# Patient Record
Sex: FEMALE | Race: WHITE
[De-identification: names, ages, dates, MRNs, and addresses within clinical notes are randomized per-mention and may not be internally consistent; named-entity substitution may affect disease eponyms.]

---

## 2021-03-12 ENCOUNTER — HOSPITAL ENCOUNTER (OUTPATIENT)
Dept: HOSPITAL 95 - MOI US | Age: 71
Discharge: HOME | End: 2021-03-12
Attending: FAMILY MEDICINE
Payer: MEDICARE

## 2021-03-12 DIAGNOSIS — C50.412: Primary | ICD-10-CM

## 2021-03-12 DIAGNOSIS — Z17.0: ICD-10-CM

## 2021-03-12 PROCEDURE — A4648 IMPLANTABLE TISSUE MARKER: HCPCS

## 2021-03-26 ENCOUNTER — HOSPITAL ENCOUNTER (OUTPATIENT)
Dept: HOSPITAL 95 - MOI MAM | Age: 71
Discharge: HOME | End: 2021-03-26
Attending: STUDENT IN AN ORGANIZED HEALTH CARE EDUCATION/TRAINING PROGRAM
Payer: MEDICARE

## 2021-03-26 DIAGNOSIS — C50.412: Primary | ICD-10-CM

## 2021-03-26 DIAGNOSIS — Z17.0: ICD-10-CM

## 2021-03-26 PROCEDURE — A4648 IMPLANTABLE TISSUE MARKER: HCPCS

## 2021-03-30 ENCOUNTER — HOSPITAL ENCOUNTER (OUTPATIENT)
Dept: HOSPITAL 95 - NM | Age: 71
Discharge: HOME | End: 2021-03-30
Attending: STUDENT IN AN ORGANIZED HEALTH CARE EDUCATION/TRAINING PROGRAM
Payer: MEDICARE

## 2021-03-30 VITALS — HEIGHT: 65.98 IN | BODY MASS INDEX: 44.15 KG/M2 | WEIGHT: 274.7 LBS

## 2021-03-30 DIAGNOSIS — E66.01: ICD-10-CM

## 2021-03-30 DIAGNOSIS — I10: ICD-10-CM

## 2021-03-30 DIAGNOSIS — Z79.899: ICD-10-CM

## 2021-03-30 DIAGNOSIS — D36.0: ICD-10-CM

## 2021-03-30 DIAGNOSIS — J44.9: ICD-10-CM

## 2021-03-30 DIAGNOSIS — Z17.0: ICD-10-CM

## 2021-03-30 DIAGNOSIS — C50.412: Primary | ICD-10-CM

## 2021-03-30 PROCEDURE — A9520 TC99 TILMANOCEPT DIAG 0.5MCI: HCPCS

## 2021-03-30 PROCEDURE — 0HBU0ZZ EXCISION OF LEFT BREAST, OPEN APPROACH: ICD-10-PCS | Performed by: STUDENT IN AN ORGANIZED HEALTH CARE EDUCATION/TRAINING PROGRAM

## 2021-03-30 PROCEDURE — 07B60ZX EXCISION OF LEFT AXILLARY LYMPHATIC, OPEN APPROACH, DIAGNOSTIC: ICD-10-PCS | Performed by: STUDENT IN AN ORGANIZED HEALTH CARE EDUCATION/TRAINING PROGRAM

## 2021-03-30 PROCEDURE — A9270 NON-COVERED ITEM OR SERVICE: HCPCS

## 2021-03-30 NOTE — NUR
REVIEWED DC INSTRUCTIONS W/ PT SON NICK AND WITH PT. PT STATES LEG PAIN MIGHT
BE COMING FROM HER BACK, STATES OVERALL PAIN BETTER THAN WHEN SHE WOKE UP IN
PACU. GAVE NORCO PER ORDER, PT HAD PUDDING AND WATER, DISCUSSED PAIN
MEDICATION WITH PT AS PT DOES NOT HAVE TRUE ALLERGY TO OXYCODONE AND CODEINE,
PT AGREEABLE TO TRY HYDROCODONE AS ORDERED. BREAST BINDER ON,C/D/I. REPORT
GIVEN TO AGUSTIN.

## 2021-03-30 NOTE — NUR
PT CONTINUES TO DENY ABD PAIN OR NAUSEA p MEDS.
PT SITS AT SIDE OF BED c MINIMAL ASSISTANCE.
IV DC'D, CATH INTACT AND PRESSURE DRESSING APPLIED.
PT CONTINUES TO VERBALIZE AN UNDERSTANDING OF INSTRUCTIONS.  NO QUESTIONS.
ASSISTED c DRESSING, DRESSING CLEAN AND INTACT.
TAKEN OTD IN NAD VIA WC.
SON CONTINUES TO VERBALIZE AN UNDERSTANDING OF INSTRUCTIONS.
TRANSFERS TO CAR s DIFFICULTY.

## 2021-03-30 NOTE — NUR
Ambulatory in Day Surgery
History, Chart, Medications and Allergies reviewed before start of
procedure.Patient confirms NPO status and agrees with scheduled surgery.
Patient reports completing Chlorhexadine shower X2 prior to admission to
hospital.

## 2021-06-07 ENCOUNTER — HOSPITAL ENCOUNTER (OUTPATIENT)
Dept: HOSPITAL 95 - LAB | Age: 71
Discharge: HOME | End: 2021-06-07
Attending: OBSTETRICS & GYNECOLOGY
Payer: MEDICARE

## 2021-06-07 DIAGNOSIS — N76.0: Primary | ICD-10-CM

## 2021-06-30 ENCOUNTER — HOSPITAL ENCOUNTER (OUTPATIENT)
Dept: HOSPITAL 95 - LAB SHORT | Age: 71
End: 2021-06-30
Attending: OBSTETRICS & GYNECOLOGY
Payer: MEDICARE

## 2021-06-30 DIAGNOSIS — Z88.5: ICD-10-CM

## 2021-06-30 DIAGNOSIS — N89.8: Primary | ICD-10-CM

## 2021-06-30 DIAGNOSIS — Z88.6: ICD-10-CM

## 2021-06-30 DIAGNOSIS — Z91.048: ICD-10-CM

## 2021-06-30 DIAGNOSIS — Z88.8: ICD-10-CM

## 2021-10-29 ENCOUNTER — HOSPITAL ENCOUNTER (EMERGENCY)
Dept: HOSPITAL 95 - ER | Age: 71
Discharge: HOME | End: 2021-10-29
Payer: MEDICARE

## 2021-10-29 VITALS — HEIGHT: 66 IN | BODY MASS INDEX: 43.07 KG/M2 | WEIGHT: 267.99 LBS

## 2021-10-29 DIAGNOSIS — Z85.3: ICD-10-CM

## 2021-10-29 DIAGNOSIS — I10: ICD-10-CM

## 2021-10-29 DIAGNOSIS — Z88.5: ICD-10-CM

## 2021-10-29 DIAGNOSIS — K21.9: ICD-10-CM

## 2021-10-29 DIAGNOSIS — Z91.048: ICD-10-CM

## 2021-10-29 DIAGNOSIS — Z79.899: ICD-10-CM

## 2021-10-29 DIAGNOSIS — Z88.8: ICD-10-CM

## 2021-10-29 DIAGNOSIS — R07.2: Primary | ICD-10-CM

## 2021-10-29 DIAGNOSIS — Z88.1: ICD-10-CM

## 2021-10-29 LAB
ALBUMIN SERPL BCP-MCNC: 3.3 G/DL (ref 3.4–5)
ALBUMIN/GLOB SERPL: 0.8 {RATIO} (ref 0.8–1.8)
ALT SERPL W P-5'-P-CCNC: 23 U/L (ref 12–78)
ANION GAP SERPL CALCULATED.4IONS-SCNC: 4 MMOL/L (ref 6–16)
AST SERPL W P-5'-P-CCNC: 16 U/L (ref 12–37)
BASOPHILS # BLD AUTO: 0.04 K/MM3 (ref 0–0.23)
BASOPHILS NFR BLD AUTO: 0 % (ref 0–2)
BILIRUB SERPL-MCNC: 0.2 MG/DL (ref 0.1–1)
BUN SERPL-MCNC: 21 MG/DL (ref 8–24)
CALCIUM SERPL-MCNC: 9.5 MG/DL (ref 8.5–10.1)
CHLORIDE SERPL-SCNC: 110 MMOL/L (ref 98–108)
CO2 SERPL-SCNC: 28 MMOL/L (ref 21–32)
CREAT SERPL-MCNC: 0.95 MG/DL (ref 0.4–1)
DEPRECATED RDW RBC AUTO: 48.5 FL (ref 35.1–46.3)
EOSINOPHIL # BLD AUTO: 0.19 K/MM3 (ref 0–0.68)
EOSINOPHIL NFR BLD AUTO: 2 % (ref 0–6)
ERYTHROCYTE [DISTWIDTH] IN BLOOD BY AUTOMATED COUNT: 13.7 % (ref 11.7–14.2)
GLOBULIN SER CALC-MCNC: 4.1 G/DL (ref 2.2–4)
GLUCOSE SERPL-MCNC: 121 MG/DL (ref 70–99)
HCT VFR BLD AUTO: 42.9 % (ref 33–51)
HGB BLD-MCNC: 14.6 G/DL (ref 11.5–16)
IMM GRANULOCYTES # BLD AUTO: 0.02 K/MM3 (ref 0–0.1)
IMM GRANULOCYTES NFR BLD AUTO: 0 % (ref 0–1)
LYMPHOCYTES # BLD AUTO: 1.8 K/MM3 (ref 0.84–5.2)
LYMPHOCYTES NFR BLD AUTO: 19 % (ref 21–46)
MCHC RBC AUTO-ENTMCNC: 34 G/DL (ref 31.5–36.5)
MCV RBC AUTO: 95 FL (ref 80–100)
MONOCYTES # BLD AUTO: 0.8 K/MM3 (ref 0.16–1.47)
MONOCYTES NFR BLD AUTO: 9 % (ref 4–13)
NEUTROPHILS # BLD AUTO: 6.61 K/MM3 (ref 1.96–9.15)
NEUTROPHILS NFR BLD AUTO: 70 % (ref 41–73)
NRBC # BLD AUTO: 0 K/MM3 (ref 0–0.02)
NRBC BLD AUTO-RTO: 0 /100 WBC (ref 0–0.2)
PLATELET # BLD AUTO: 251 K/MM3 (ref 150–400)
POTASSIUM SERPL-SCNC: 3.6 MMOL/L (ref 3.5–5.5)
PROT SERPL-MCNC: 7.4 G/DL (ref 6.4–8.2)
SODIUM SERPL-SCNC: 142 MMOL/L (ref 136–145)
TROPONIN I SERPL-MCNC: <0.015 NG/ML (ref 0–0.04)

## 2022-05-23 ENCOUNTER — HOSPITAL ENCOUNTER (OUTPATIENT)
Dept: HOSPITAL 95 - LAB SHORT | Age: 72
Discharge: HOME | End: 2022-05-23
Attending: FAMILY MEDICINE
Payer: MEDICARE

## 2022-05-23 DIAGNOSIS — R53.83: Primary | ICD-10-CM

## 2022-10-03 ENCOUNTER — HOSPITAL ENCOUNTER (OUTPATIENT)
Dept: HOSPITAL 95 - LAB SHORT | Age: 72
Discharge: HOME | End: 2022-10-03
Attending: OBSTETRICS & GYNECOLOGY
Payer: MEDICARE

## 2022-10-03 DIAGNOSIS — N76.0: Primary | ICD-10-CM

## 2025-01-23 ENCOUNTER — HOSPITAL ENCOUNTER (OUTPATIENT)
Dept: HOSPITAL 95 - ORSCSDS | Age: 75
Discharge: HOME | End: 2025-01-23
Attending: OBSTETRICS & GYNECOLOGY
Payer: MEDICARE

## 2025-01-23 VITALS — DIASTOLIC BLOOD PRESSURE: 76 MMHG | SYSTOLIC BLOOD PRESSURE: 168 MMHG

## 2025-01-23 VITALS — WEIGHT: 273.37 LBS | BODY MASS INDEX: 43.93 KG/M2 | HEIGHT: 66 IN

## 2025-01-23 DIAGNOSIS — N84.0: ICD-10-CM

## 2025-01-23 DIAGNOSIS — N95.0: Primary | ICD-10-CM

## 2025-01-23 DIAGNOSIS — N90.60: ICD-10-CM

## 2025-01-23 DIAGNOSIS — R93.89: ICD-10-CM
